# Patient Record
Sex: FEMALE | Race: WHITE
[De-identification: names, ages, dates, MRNs, and addresses within clinical notes are randomized per-mention and may not be internally consistent; named-entity substitution may affect disease eponyms.]

---

## 2021-04-21 ENCOUNTER — HOSPITAL ENCOUNTER (INPATIENT)
Dept: HOSPITAL 75 - LDRP | Age: 26
LOS: 2 days | Discharge: HOME | End: 2021-04-23
Attending: OBSTETRICS & GYNECOLOGY | Admitting: OBSTETRICS & GYNECOLOGY
Payer: MEDICAID

## 2021-04-21 VITALS — DIASTOLIC BLOOD PRESSURE: 72 MMHG | SYSTOLIC BLOOD PRESSURE: 122 MMHG

## 2021-04-21 VITALS — SYSTOLIC BLOOD PRESSURE: 131 MMHG | DIASTOLIC BLOOD PRESSURE: 76 MMHG

## 2021-04-21 VITALS — SYSTOLIC BLOOD PRESSURE: 122 MMHG | DIASTOLIC BLOOD PRESSURE: 82 MMHG

## 2021-04-21 VITALS — DIASTOLIC BLOOD PRESSURE: 70 MMHG | SYSTOLIC BLOOD PRESSURE: 138 MMHG

## 2021-04-21 VITALS — DIASTOLIC BLOOD PRESSURE: 86 MMHG | SYSTOLIC BLOOD PRESSURE: 136 MMHG

## 2021-04-21 VITALS — SYSTOLIC BLOOD PRESSURE: 124 MMHG | DIASTOLIC BLOOD PRESSURE: 79 MMHG

## 2021-04-21 VITALS — DIASTOLIC BLOOD PRESSURE: 80 MMHG | SYSTOLIC BLOOD PRESSURE: 135 MMHG

## 2021-04-21 VITALS — SYSTOLIC BLOOD PRESSURE: 132 MMHG | DIASTOLIC BLOOD PRESSURE: 73 MMHG

## 2021-04-21 VITALS — DIASTOLIC BLOOD PRESSURE: 96 MMHG | SYSTOLIC BLOOD PRESSURE: 147 MMHG

## 2021-04-21 VITALS — DIASTOLIC BLOOD PRESSURE: 71 MMHG | SYSTOLIC BLOOD PRESSURE: 141 MMHG

## 2021-04-21 VITALS — SYSTOLIC BLOOD PRESSURE: 123 MMHG | DIASTOLIC BLOOD PRESSURE: 84 MMHG

## 2021-04-21 VITALS — SYSTOLIC BLOOD PRESSURE: 130 MMHG | DIASTOLIC BLOOD PRESSURE: 81 MMHG

## 2021-04-21 VITALS — SYSTOLIC BLOOD PRESSURE: 148 MMHG | DIASTOLIC BLOOD PRESSURE: 80 MMHG

## 2021-04-21 VITALS — DIASTOLIC BLOOD PRESSURE: 65 MMHG | SYSTOLIC BLOOD PRESSURE: 127 MMHG

## 2021-04-21 VITALS — SYSTOLIC BLOOD PRESSURE: 123 MMHG | DIASTOLIC BLOOD PRESSURE: 73 MMHG

## 2021-04-21 VITALS — SYSTOLIC BLOOD PRESSURE: 135 MMHG | DIASTOLIC BLOOD PRESSURE: 67 MMHG

## 2021-04-21 VITALS — HEIGHT: 67.01 IN | WEIGHT: 293 LBS | BODY MASS INDEX: 45.99 KG/M2

## 2021-04-21 VITALS — SYSTOLIC BLOOD PRESSURE: 142 MMHG | DIASTOLIC BLOOD PRESSURE: 67 MMHG

## 2021-04-21 VITALS — DIASTOLIC BLOOD PRESSURE: 84 MMHG | SYSTOLIC BLOOD PRESSURE: 130 MMHG

## 2021-04-21 VITALS — DIASTOLIC BLOOD PRESSURE: 86 MMHG | SYSTOLIC BLOOD PRESSURE: 132 MMHG

## 2021-04-21 VITALS — SYSTOLIC BLOOD PRESSURE: 142 MMHG | DIASTOLIC BLOOD PRESSURE: 60 MMHG

## 2021-04-21 VITALS — SYSTOLIC BLOOD PRESSURE: 127 MMHG | DIASTOLIC BLOOD PRESSURE: 83 MMHG

## 2021-04-21 VITALS — DIASTOLIC BLOOD PRESSURE: 76 MMHG | SYSTOLIC BLOOD PRESSURE: 128 MMHG

## 2021-04-21 VITALS — SYSTOLIC BLOOD PRESSURE: 145 MMHG | DIASTOLIC BLOOD PRESSURE: 80 MMHG

## 2021-04-21 VITALS — DIASTOLIC BLOOD PRESSURE: 75 MMHG | SYSTOLIC BLOOD PRESSURE: 123 MMHG

## 2021-04-21 VITALS — SYSTOLIC BLOOD PRESSURE: 124 MMHG | DIASTOLIC BLOOD PRESSURE: 71 MMHG

## 2021-04-21 VITALS — SYSTOLIC BLOOD PRESSURE: 128 MMHG | DIASTOLIC BLOOD PRESSURE: 71 MMHG

## 2021-04-21 VITALS — DIASTOLIC BLOOD PRESSURE: 87 MMHG | SYSTOLIC BLOOD PRESSURE: 143 MMHG

## 2021-04-21 VITALS — SYSTOLIC BLOOD PRESSURE: 139 MMHG | DIASTOLIC BLOOD PRESSURE: 83 MMHG

## 2021-04-21 VITALS — DIASTOLIC BLOOD PRESSURE: 68 MMHG | SYSTOLIC BLOOD PRESSURE: 143 MMHG

## 2021-04-21 VITALS — DIASTOLIC BLOOD PRESSURE: 78 MMHG | SYSTOLIC BLOOD PRESSURE: 127 MMHG

## 2021-04-21 VITALS — SYSTOLIC BLOOD PRESSURE: 125 MMHG | DIASTOLIC BLOOD PRESSURE: 71 MMHG

## 2021-04-21 VITALS — DIASTOLIC BLOOD PRESSURE: 70 MMHG | SYSTOLIC BLOOD PRESSURE: 127 MMHG

## 2021-04-21 VITALS — SYSTOLIC BLOOD PRESSURE: 105 MMHG | DIASTOLIC BLOOD PRESSURE: 84 MMHG

## 2021-04-21 VITALS — DIASTOLIC BLOOD PRESSURE: 76 MMHG | SYSTOLIC BLOOD PRESSURE: 119 MMHG

## 2021-04-21 VITALS — SYSTOLIC BLOOD PRESSURE: 147 MMHG | DIASTOLIC BLOOD PRESSURE: 81 MMHG

## 2021-04-21 VITALS — SYSTOLIC BLOOD PRESSURE: 131 MMHG | DIASTOLIC BLOOD PRESSURE: 74 MMHG

## 2021-04-21 VITALS — SYSTOLIC BLOOD PRESSURE: 136 MMHG | DIASTOLIC BLOOD PRESSURE: 79 MMHG

## 2021-04-21 VITALS — SYSTOLIC BLOOD PRESSURE: 137 MMHG | DIASTOLIC BLOOD PRESSURE: 82 MMHG

## 2021-04-21 VITALS — SYSTOLIC BLOOD PRESSURE: 126 MMHG | DIASTOLIC BLOOD PRESSURE: 74 MMHG

## 2021-04-21 VITALS — DIASTOLIC BLOOD PRESSURE: 74 MMHG | SYSTOLIC BLOOD PRESSURE: 116 MMHG

## 2021-04-21 VITALS — SYSTOLIC BLOOD PRESSURE: 131 MMHG | DIASTOLIC BLOOD PRESSURE: 85 MMHG

## 2021-04-21 VITALS — SYSTOLIC BLOOD PRESSURE: 142 MMHG | DIASTOLIC BLOOD PRESSURE: 95 MMHG

## 2021-04-21 VITALS — SYSTOLIC BLOOD PRESSURE: 132 MMHG | DIASTOLIC BLOOD PRESSURE: 77 MMHG

## 2021-04-21 VITALS — SYSTOLIC BLOOD PRESSURE: 122 MMHG | DIASTOLIC BLOOD PRESSURE: 74 MMHG

## 2021-04-21 VITALS — DIASTOLIC BLOOD PRESSURE: 84 MMHG | SYSTOLIC BLOOD PRESSURE: 136 MMHG

## 2021-04-21 VITALS — DIASTOLIC BLOOD PRESSURE: 74 MMHG | SYSTOLIC BLOOD PRESSURE: 122 MMHG

## 2021-04-21 VITALS — DIASTOLIC BLOOD PRESSURE: 68 MMHG | SYSTOLIC BLOOD PRESSURE: 137 MMHG

## 2021-04-21 VITALS — DIASTOLIC BLOOD PRESSURE: 78 MMHG | SYSTOLIC BLOOD PRESSURE: 124 MMHG

## 2021-04-21 DIAGNOSIS — Z3A.38: ICD-10-CM

## 2021-04-21 LAB
ALBUMIN SERPL-MCNC: 3.4 GM/DL (ref 3.2–4.5)
ALP SERPL-CCNC: 146 U/L (ref 40–136)
ALT SERPL-CCNC: 15 U/L (ref 0–55)
BASOPHILS # BLD AUTO: 0 10^3/UL (ref 0–0.1)
BASOPHILS NFR BLD AUTO: 0 % (ref 0–10)
BILIRUB SERPL-MCNC: 0.4 MG/DL (ref 0.1–1)
BUN/CREAT SERPL: 14
CALCIUM SERPL-MCNC: 8.7 MG/DL (ref 8.5–10.1)
CHLORIDE SERPL-SCNC: 105 MMOL/L (ref 98–107)
CO2 SERPL-SCNC: 18 MMOL/L (ref 21–32)
CREAT SERPL-MCNC: 0.66 MG/DL (ref 0.6–1.3)
CREAT UR-MCNC: 339 MG/DL (ref 30–125)
EOSINOPHIL # BLD AUTO: 0.1 10^3/UL (ref 0–0.3)
EOSINOPHIL NFR BLD AUTO: 1 % (ref 0–10)
GFR SERPLBLD BASED ON 1.73 SQ M-ARVRAT: > 60 ML/MIN
GLUCOSE SERPL-MCNC: 66 MG/DL (ref 70–105)
HCT VFR BLD CALC: 36 % (ref 35–52)
HGB BLD-MCNC: 11.6 G/DL (ref 11.5–16)
LYMPHOCYTES # BLD AUTO: 1.7 10^3/UL (ref 1–4)
LYMPHOCYTES NFR BLD AUTO: 15 % (ref 12–44)
MANUAL DIFFERENTIAL PERFORMED BLD QL: NO
MCH RBC QN AUTO: 28 PG (ref 25–34)
MCHC RBC AUTO-ENTMCNC: 33 G/DL (ref 32–36)
MCV RBC AUTO: 85 FL (ref 80–99)
MONOCYTES # BLD AUTO: 0.8 10^3/UL (ref 0–1)
MONOCYTES NFR BLD AUTO: 7 % (ref 0–12)
NEUTROPHILS # BLD AUTO: 8.6 10^3/UL (ref 1.8–7.8)
NEUTROPHILS NFR BLD AUTO: 75 % (ref 42–75)
PLATELET # BLD: 233 10^3/UL (ref 130–400)
PMV BLD AUTO: 11.2 FL (ref 9–12.2)
POTASSIUM SERPL-SCNC: 4 MMOL/L (ref 3.6–5)
PROT SERPL-MCNC: 7.1 GM/DL (ref 6.4–8.2)
PROT UR-MCNC: 66 MG/DL (ref 6–12)
SODIUM SERPL-SCNC: 135 MMOL/L (ref 135–145)
URATE SERPL-MCNC: 5.2 MG/DL (ref 2.6–7.2)
WBC # BLD AUTO: 11.4 10^3/UL (ref 4.3–11)

## 2021-04-21 PROCEDURE — 83615 LACTATE (LD) (LDH) ENZYME: CPT

## 2021-04-21 PROCEDURE — 36415 COLL VENOUS BLD VENIPUNCTURE: CPT

## 2021-04-21 PROCEDURE — 80053 COMPREHEN METABOLIC PANEL: CPT

## 2021-04-21 PROCEDURE — 84156 ASSAY OF PROTEIN URINE: CPT

## 2021-04-21 PROCEDURE — 85025 COMPLETE CBC W/AUTO DIFF WBC: CPT

## 2021-04-21 PROCEDURE — 86901 BLOOD TYPING SEROLOGIC RH(D): CPT

## 2021-04-21 PROCEDURE — 86780 TREPONEMA PALLIDUM: CPT

## 2021-04-21 PROCEDURE — 86850 RBC ANTIBODY SCREEN: CPT

## 2021-04-21 PROCEDURE — 84550 ASSAY OF BLOOD/URIC ACID: CPT

## 2021-04-21 PROCEDURE — 82570 ASSAY OF URINE CREATININE: CPT

## 2021-04-21 PROCEDURE — 86900 BLOOD TYPING SEROLOGIC ABO: CPT

## 2021-04-21 RX ADMIN — SODIUM CHLORIDE, SODIUM LACTATE, POTASSIUM CHLORIDE, CALCIUM CHLORIDE, AND DEXTROSE MONOHYDRATE SCH MLS/HR: 600; 310; 30; 20; 5 INJECTION, SOLUTION INTRAVENOUS at 12:08

## 2021-04-21 RX ADMIN — SODIUM CHLORIDE, SODIUM LACTATE, POTASSIUM CHLORIDE, CALCIUM CHLORIDE, AND DEXTROSE MONOHYDRATE SCH MLS/HR: 600; 310; 30; 20; 5 INJECTION, SOLUTION INTRAVENOUS at 18:21

## 2021-04-21 NOTE — HISTORY & PHYSICAL
History and Physical


Date Seen by Provider:  2021


Time Seen by Provider:  12:37


This patient is a 26-year-old  2 para 1 female patient seen in clinic on 

this date at 38-1/7 weeks gestation and found to have significant proteinuria 

and elevated blood pressure consistent with preeclampsia.  She has had no other 

problems with this pregnancy.  She had no problem with her first pregnancy and 

had a vaginal delivery.  Her GBS culture after 35 weeks gestation with this 

pregnancy was negative.  She was sent from my office to labor and delivery due 

to preeclampsia.





Allergies are sulfa drugs





Medications are prenatal vitamins





Medical social and surgical history is all per the antepartum record





HEENT exam is normal


Neck is supple no lymphadenopathy no thyromegaly


Abdomen is gravid soft nontender nondistended


Extremities show no clubbing or cyanosis.  There is no Homans' sign.





Pelvic exam now shows a cervix almost 2 cm dilated 80% effaced -1 station vertex

presentation with intact membranes that are ruptured artificially and a scalp 

electrode was placed





Fetal monitor shows some uterine irritability but no discrete contractions and a

normal fetal heart rate pattern





Assessment and plan      38+ weeks gestation with preeclampsia and a  2 

para 1 who is admitted now for labor induction.  We anticipate a vaginal 

delivery.  Lab work is pending for evaluation of the state of her preeclampsia 

should she demonstrate severe preeclampsia we would add magnesium for seizure 

prophylaxis





Allergies and Home Medications


Allergies


Uncoded Allergies:  


     SULFA (Allergy, Unknown, 21)


   PT'S MOTHER AND DAUGHTER ALLERGIC- UNSURE IF ALLERGY, WANTS


   LISTED





Patient Home Medication List


Home Medication List Reviewed:  Yes











RODY MAYORGA MD       2021 12:39

## 2021-04-22 VITALS — DIASTOLIC BLOOD PRESSURE: 74 MMHG | SYSTOLIC BLOOD PRESSURE: 158 MMHG

## 2021-04-22 VITALS — DIASTOLIC BLOOD PRESSURE: 58 MMHG | SYSTOLIC BLOOD PRESSURE: 123 MMHG

## 2021-04-22 VITALS — DIASTOLIC BLOOD PRESSURE: 58 MMHG | SYSTOLIC BLOOD PRESSURE: 129 MMHG

## 2021-04-22 VITALS — DIASTOLIC BLOOD PRESSURE: 53 MMHG | SYSTOLIC BLOOD PRESSURE: 116 MMHG

## 2021-04-22 VITALS — DIASTOLIC BLOOD PRESSURE: 69 MMHG | SYSTOLIC BLOOD PRESSURE: 117 MMHG

## 2021-04-22 VITALS — DIASTOLIC BLOOD PRESSURE: 61 MMHG | SYSTOLIC BLOOD PRESSURE: 106 MMHG

## 2021-04-22 RX ADMIN — IBUPROFEN SCH MG: 800 TABLET, FILM COATED ORAL at 12:22

## 2021-04-22 RX ADMIN — IBUPROFEN SCH MG: 800 TABLET, FILM COATED ORAL at 01:09

## 2021-04-22 RX ADMIN — IBUPROFEN SCH MG: 800 TABLET, FILM COATED ORAL at 06:32

## 2021-04-22 RX ADMIN — IBUPROFEN SCH MG: 800 TABLET, FILM COATED ORAL at 23:07

## 2021-04-22 RX ADMIN — DOCUSATE SODIUM SCH MG: 100 CAPSULE ORAL at 09:23

## 2021-04-22 RX ADMIN — DOCUSATE SODIUM SCH MG: 100 CAPSULE ORAL at 20:12

## 2021-04-22 RX ADMIN — IBUPROFEN SCH MG: 800 TABLET, FILM COATED ORAL at 17:51

## 2021-04-22 NOTE — DISCHARGE INST-SURGICAL
Discharge Inst-Surgical


Depart Medication/Instructions


New, Converted or Re-Newed RX:  RX on Chart





Consults/Follow Up


Patient Instructions:  


As directed


Orders & Referrals





Follow Up Appt:


Call to make follow up appt. for patient in 4 weeks.





Activity 


Per routine post vaginal delivery instructions.





Please call in RX to patient pharmacy.





Diet as tolerated





Patient may shower or tub bathe as desired.





Activity


Activity as Tolerated:  No





Diet


Discharge Diet:  No Restrictions











RODY MAYORGA MD       Apr 22, 2021 09:10

## 2021-04-22 NOTE — PROGRESS NOTE
Standard Progress Note


Progress Notes/Assess & Plan


Date Seen by a Provider:  Apr 22, 2021


Time Seen by a Provider:  09:08


Progress/Assessment & Plan


This patient is without complaint.  She is ambulating, voiding, tolerating oral 

intake well and has good pain control.








Vital Signs








 4/21/21 4/22/21





 20:40 04:20


 


Temp  36.5


 


Pulse  72


 


Resp  18


 


B/P (MAP)  106/61 (76)


 


Pulse Ox  98


 


O2 Delivery  Room Air


 


O2 Flow Rate 0.00 











Vital signs are stable.  Patient is afebrile.





Fundus is firm below the umbilicus and nontender.


Extremities show no clubbing cyanosis.  There is no Homans' sign.





Assessment and plan


Postpartum day #1 status post term spontaneous vaginal labor at 38+ weeks 

gestation.  Patient is doing well and will have routine convalescent care











RODY MAYORGA MD       Apr 22, 2021 09:08

## 2021-04-22 NOTE — OPERATIVE REPORT
DATE OF SERVICE:  2021



DELIVERY NOTE



The patient delivered by term spontaneous vaginal delivery at 38 weeks

gestation, a viable female infant with Apgars of 9 and 9 at 1 and 5 minutes

respectively, weight of 7 pounds 1 ounce, birth time of  and a cord blood pH

that is pending.



The infant was delivered over a midline episiotomy that was performed when the

baby was .  Mom could not push the baby out.  Heart rate was dropping

into the 40s.  Then, the episiotomy was performed to shorten the second stage of

labor due to decreased fetal heart rate.



The infant delivered fairly promptly after the episiotomy.  The infant was bulb

suctioned on delivery of the head and again on completion of delivery.  The

umbilical cord was doubly clamped.  The umbilical cord was cut and the infant

passed to mom's abdomen.



Cord bloods were obtained.  The placenta delivered spontaneously [place].  It

was normal with a 3-vessel cord.  The cervix, vagina, rectum, and perineum were

examined and found intact, except for the midline episiotomy that was repaired

with a single suture of 3-0 Vicryl Rapide in the usual manner without difficulty

to good hemostasis.



Sponge and needle counts were correct on completion of the delivery and repair. 

Blood loss was around 300 mL.



The patient tolerated the delivery and the repair well and remained in the LDR

for recovery.  The baby remained with the mom.





Job ID: [id number]

DocumentID: [id number]

Dictated Date:  2021 09:03:14

Transcription Date: 2021 14:03:08

Dictated By: RODY MAYORGA MD

## 2021-04-23 VITALS — DIASTOLIC BLOOD PRESSURE: 59 MMHG | SYSTOLIC BLOOD PRESSURE: 129 MMHG

## 2021-04-23 VITALS — SYSTOLIC BLOOD PRESSURE: 108 MMHG | DIASTOLIC BLOOD PRESSURE: 54 MMHG

## 2021-04-23 RX ADMIN — DOCUSATE SODIUM SCH MG: 100 CAPSULE ORAL at 08:53

## 2021-04-23 RX ADMIN — IBUPROFEN SCH MG: 800 TABLET, FILM COATED ORAL at 05:26

## 2021-04-23 NOTE — PROGRESS NOTE
Standard Progress Note


Progress Notes/Assess & Plan


Date Seen by a Provider:  Apr 23, 2021


Time Seen by a Provider:  08:16


Progress/Assessment & Plan


This patient is without complaint.  She is ambulating, voiding, tolerating oral 

intake well and has good pain control.








Vital Signs








 4/21/21 4/22/21





 20:40 04:20


 


Temp  36.5


 


Pulse  72


 


Resp  18


 


B/P (MAP)  106/61 (76)


 


Pulse Ox  98


 


O2 Delivery  Room Air


 


O2 Flow Rate 0.00 











Vital signs are stable.  Patient is afebrile.





Fundus is firm below the umbilicus and nontender.


Extremities show no clubbing cyanosis.  There is no Homans' sign.





Assessment and plan


Postpartum day #1 status post term spontaneous vaginal labor at 38+ weeks 

gestation.  Patient is doing well and will have routine convalescent care





Without complaint.  She is ambulating, voiding, tolerating oral intake well and 

has good pain control.








Vital Signs








 4/21/21 4/23/21





 20:40 05:29


 


Temp  36.3


 


Pulse  77


 


Resp  16


 


B/P (MAP)  108/54 (72)


 


Pulse Ox  98


 


O2 Delivery  Room Air


 


O2 Flow Rate 0.00 











Fundus is firm below the umbilicus and nontender.


Extremities show no clubbing cyanosis.  There is no Homans' sign.





Assessment and plan   post partum day 2 post term spontaneous vaginal delivery 

doing well.  Plan is for discharge home


Final Diagnosis


38-week spontaneous vaginal delivery











RODY MAYORGA MD       Apr 23, 2021 08:17